# Patient Record
Sex: MALE | Race: OTHER | HISPANIC OR LATINO | URBAN - METROPOLITAN AREA
[De-identification: names, ages, dates, MRNs, and addresses within clinical notes are randomized per-mention and may not be internally consistent; named-entity substitution may affect disease eponyms.]

---

## 2019-07-02 ENCOUNTER — EMERGENCY (EMERGENCY)
Facility: HOSPITAL | Age: 54
LOS: 1 days | Discharge: HOME | End: 2019-07-02
Attending: EMERGENCY MEDICINE | Admitting: EMERGENCY MEDICINE
Payer: SELF-PAY

## 2019-07-02 VITALS
OXYGEN SATURATION: 96 % | RESPIRATION RATE: 16 BRPM | HEART RATE: 71 BPM | TEMPERATURE: 98 F | SYSTOLIC BLOOD PRESSURE: 102 MMHG | DIASTOLIC BLOOD PRESSURE: 55 MMHG

## 2019-07-02 VITALS
RESPIRATION RATE: 18 BRPM | OXYGEN SATURATION: 98 % | SYSTOLIC BLOOD PRESSURE: 111 MMHG | HEART RATE: 68 BPM | DIASTOLIC BLOOD PRESSURE: 61 MMHG | TEMPERATURE: 98 F
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
## 2019-07-02 DIAGNOSIS — S01.01XA LACERATION WITHOUT FOREIGN BODY OF SCALP, INITIAL ENCOUNTER: ICD-10-CM
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
## 2019-07-02 PROCEDURE — 70450 CT HEAD/BRAIN W/O DYE: CPT | Mod: 26

## 2019-07-02 PROCEDURE — 99053 MED SERV 10PM-8AM 24 HR FAC: CPT

## 2019-07-02 PROCEDURE — 99284 EMERGENCY DEPT VISIT MOD MDM: CPT | Mod: 25

## 2019-07-02 PROCEDURE — 12002 RPR S/N/AX/GEN/TRNK2.6-7.5CM: CPT

## 2019-07-02 NOTE — ED ADULT NURSE NOTE - OBJECTIVE STATEMENT
54 y/o M who comes into the ed c/o AMS and fall. pt a&ox4- reports he fell tonight and was drinking alcohol . pt sleepy and reports he wants to rest. laceration noted to scalp.

## 2019-07-02 NOTE — ED ADULT NURSE NOTE - NSIMPLEMENTINTERV_GEN_ALL_ED
Implemented All Fall Risk Interventions:  [place] to call system. Call bell, personal items and telephone within reach. Instruct patient to call for assistance. Room bathroom lighting operational. Non-slip footwear when patient is off stretcher. Physically safe environment: no spills, clutter or unnecessary equipment. Stretcher in lowest position, wheels locked, appropriate side rails in place. Provide visual cue, wrist band, yellow gown, etc. Monitor gait and stability. Monitor for mental status changes and reorient to person, place, and time. Review medications for side effects contributing to fall risk. Reinforce activity limits and safety measures with patient and family.

## 2019-07-02 NOTE — ED PROVIDER NOTE - CLINICAL SUMMARY MEDICAL DECISION MAKING FREE TEXT BOX
53 y.o. male with head trauma etoh abuse, scalp laceration, signed out to pm team for dc when sober. Ct negative.

## 2019-07-02 NOTE — ED PROVIDER NOTE - PROGRESS NOTE DETAILS
signed out by Dr Cantor, pending waking up, clinical sobriety. Pt now alert, awake, answering all questions, explained local care for staples and removal in 10 days/

## 2019-07-02 NOTE — ED PROVIDER NOTE - OBJECTIVE STATEMENT
53 y.o. male with AMS etoh abuse (+) scalp lac fall no seizure according to EMS 53 y.o. male with AMS etoh abuse (+) scalp lac fall no seizure according to EMS was ambulatory.

## 2019-07-02 NOTE — ED ADULT TRIAGE NOTE - CHIEF COMPLAINT QUOTE
BIBA picked up from a bar for alcohol intoxication and laceration to the head. Per EMS, pt fell off the stairs from unknown height. Unknown if had LOC. Admits to blood thinner use?. +AOB, admits to alcohol use. BIBA picked up from a bar for alcohol intoxication and laceration to the head, bleeding on arrival. Per EMS, pt fell off the stairs from unknown height. Unknown if had LOC. Admits to blood thinner use?. +AOB, admits to alcohol use.

## 2019-07-02 NOTE — ED ADULT NURSE NOTE - CHIEF COMPLAINT QUOTE
BIBA picked up from a bar for alcohol intoxication and laceration to the head, bleeding on arrival. Per EMS, pt fell off the stairs from unknown height. Unknown if had LOC. Admits to blood thinner use?. +AOB, admits to alcohol use.